# Patient Record
(demographics unavailable — no encounter records)

---

## 2025-06-25 NOTE — HISTORY OF PRESENT ILLNESS
[FreeTextEntry1] : Patient is a 52 year-old  with LMP of 12 years ago here for a routine annual gynecological follow-up without complaints.  Pt's last mammogram and sonogram was 24 Pt has not had a colonoscopy. No recent bone density, hx of osteopenia.  Obstetrical history:  Gynecological history:      LMP 12 years ago Medical history: None. Denies history of anxiety or depression Surgical history: C/S Family history: No history of cancer or female cancer.     Mother has diabetes and HTN Allergies: Penicillin Medications: None Review of systems: None

## 2025-06-25 NOTE — PHYSICAL EXAM
[Chaperoned Physical Exam] : A chaperone was present in the examining room during all aspects of the physical examination. [MA] : MA [Appropriately responsive] : appropriately responsive [Alert] : alert [No Acute Distress] : no acute distress [No Lymphadenopathy] : no lymphadenopathy [Soft] : soft [Non-tender] : non-tender [Non-distended] : non-distended [No HSM] : No HSM [No Lesions] : no lesions [No Mass] : no mass [Oriented x3] : oriented x3 [Examination Of The Breasts] : a normal appearance [No Masses] : no breast masses were palpable [Labia Majora] : normal [Labia Minora] : normal [Normal] : normal [Uterine Adnexae] : normal [Anteversion] : anteverted [FreeTextEntry2] : Jessee Mcguire [FreeTextEntry9] : guaiac negative, no masses

## 2025-06-25 NOTE — PLAN
[FreeTextEntry1] : Patient here for a routine annual gynecological follow-up. Patient with normal gynecological exam. Pap smear was done. HPV was done. Breast self-examination instructed. Pt referred to [facility] for mammogram and sonogram. Pt referred to Dr Ward for colonoscopy.  Pt referred to [facility] for bone density. Patient to follow-up for annual gynecological follow-up or as needed.   PHQ-9 questionnaire reviewed with patient. Patient scored 0. No intervention needed. Total 5 minutes spent with the patient regarding questionnaire.   I, Yaneli Polk, acted solely as a scribe for Dr. Carter Moody on this date 06/25/2025.   All medical record entries made by the Scribe were at my, Dr. Carter Moody, direction and personally dictated by me on 06/25/2025. I have reviewed the chart and agree that the record accurately reflects my personal performance of the history, physical exam, assessment and plan. I have also personally directed, reviewed, and agreed with the chart.